# Patient Record
Sex: FEMALE | Race: WHITE | HISPANIC OR LATINO | Employment: UNEMPLOYED | URBAN - METROPOLITAN AREA
[De-identification: names, ages, dates, MRNs, and addresses within clinical notes are randomized per-mention and may not be internally consistent; named-entity substitution may affect disease eponyms.]

---

## 2024-05-29 ENCOUNTER — HOSPITAL ENCOUNTER (INPATIENT)
Facility: OTHER | Age: 44
LOS: 2 days | Discharge: HOME | DRG: 761 | End: 2024-05-31
Attending: EMERGENCY MEDICINE | Admitting: STUDENT IN AN ORGANIZED HEALTH CARE EDUCATION/TRAINING PROGRAM

## 2024-05-29 DIAGNOSIS — N93.8 DYSFUNCTIONAL UTERINE BLEEDING: ICD-10-CM

## 2024-05-29 DIAGNOSIS — I95.9 HYPOTENSION, UNSPECIFIED HYPOTENSION TYPE: ICD-10-CM

## 2024-05-29 DIAGNOSIS — D64.9 ANEMIA, UNSPECIFIED TYPE: ICD-10-CM

## 2024-05-29 DIAGNOSIS — N93.9 ABNORMAL UTERINE BLEEDING (AUB): Primary | ICD-10-CM

## 2024-05-29 LAB
ABO + RH BLD: NORMAL
ALBUMIN SERPL BCP-MCNC: 3.4 G/DL (ref 3.5–5.2)
ALP SERPL-CCNC: 115 U/L (ref 55–135)
ALT SERPL W/O P-5'-P-CCNC: 15 U/L (ref 10–44)
ANION GAP SERPL CALC-SCNC: 13 MMOL/L (ref 8–16)
AST SERPL-CCNC: 25 U/L (ref 10–40)
B-HCG UR QL: NEGATIVE
BACTERIA #/AREA URNS HPF: ABNORMAL /HPF
BACTERIA GENITAL QL WET PREP: ABNORMAL
BASOPHILS # BLD AUTO: 0.05 K/UL (ref 0–0.2)
BASOPHILS # BLD AUTO: 0.08 K/UL (ref 0–0.2)
BASOPHILS NFR BLD: 0.4 % (ref 0–1.9)
BASOPHILS NFR BLD: 0.5 % (ref 0–1.9)
BILIRUB SERPL-MCNC: 0.4 MG/DL (ref 0.1–1)
BILIRUB UR QL STRIP: NEGATIVE
BLD GP AB SCN CELLS X3 SERPL QL: NORMAL
BLD PROD TYP BPU: NORMAL
BLOOD UNIT EXPIRATION DATE: NORMAL
BLOOD UNIT TYPE CODE: 5100
BLOOD UNIT TYPE: NORMAL
BUN SERPL-MCNC: 8 MG/DL (ref 6–20)
CALCIUM SERPL-MCNC: 9.2 MG/DL (ref 8.7–10.5)
CHLORIDE SERPL-SCNC: 100 MMOL/L (ref 95–110)
CLARITY UR: ABNORMAL
CLUE CELLS VAG QL WET PREP: ABNORMAL
CO2 SERPL-SCNC: 21 MMOL/L (ref 23–29)
CODING SYSTEM: NORMAL
COLOR UR: ABNORMAL
CREAT SERPL-MCNC: 0.7 MG/DL (ref 0.5–1.4)
CROSSMATCH INTERPRETATION: NORMAL
CTP QC/QA: YES
DIFFERENTIAL METHOD BLD: ABNORMAL
DIFFERENTIAL METHOD BLD: ABNORMAL
DISPENSE STATUS: NORMAL
EOSINOPHIL # BLD AUTO: 0.3 K/UL (ref 0–0.5)
EOSINOPHIL # BLD AUTO: 0.3 K/UL (ref 0–0.5)
EOSINOPHIL NFR BLD: 1.5 % (ref 0–8)
EOSINOPHIL NFR BLD: 2 % (ref 0–8)
ERYTHROCYTE [DISTWIDTH] IN BLOOD BY AUTOMATED COUNT: 15.3 % (ref 11.5–14.5)
ERYTHROCYTE [DISTWIDTH] IN BLOOD BY AUTOMATED COUNT: 15.4 % (ref 11.5–14.5)
EST. GFR  (NO RACE VARIABLE): >60 ML/MIN/1.73 M^2
FILAMENT FUNGI VAG WET PREP-#/AREA: ABNORMAL
GLUCOSE SERPL-MCNC: 122 MG/DL (ref 70–110)
GLUCOSE UR QL STRIP: NEGATIVE
HCT VFR BLD AUTO: 24 % (ref 37–48.5)
HCT VFR BLD AUTO: 29.9 % (ref 37–48.5)
HCV AB SERPL QL IA: NEGATIVE
HGB BLD-MCNC: 7.8 G/DL (ref 12–16)
HGB BLD-MCNC: 9.7 G/DL (ref 12–16)
HGB UR QL STRIP: ABNORMAL
HIV 1+2 AB+HIV1 P24 AG SERPL QL IA: NEGATIVE
IMM GRANULOCYTES # BLD AUTO: 0.05 K/UL (ref 0–0.04)
IMM GRANULOCYTES # BLD AUTO: 0.08 K/UL (ref 0–0.04)
IMM GRANULOCYTES NFR BLD AUTO: 0.4 % (ref 0–0.5)
IMM GRANULOCYTES NFR BLD AUTO: 0.5 % (ref 0–0.5)
KETONES UR QL STRIP: NEGATIVE
LEUKOCYTE ESTERASE UR QL STRIP: ABNORMAL
LYMPHOCYTES # BLD AUTO: 1.9 K/UL (ref 1–4.8)
LYMPHOCYTES # BLD AUTO: 2.3 K/UL (ref 1–4.8)
LYMPHOCYTES NFR BLD: 10.9 % (ref 18–48)
LYMPHOCYTES NFR BLD: 16.5 % (ref 18–48)
MCH RBC QN AUTO: 23.3 PG (ref 27–31)
MCH RBC QN AUTO: 23.4 PG (ref 27–31)
MCHC RBC AUTO-ENTMCNC: 32.4 G/DL (ref 32–36)
MCHC RBC AUTO-ENTMCNC: 32.5 G/DL (ref 32–36)
MCV RBC AUTO: 72 FL (ref 82–98)
MCV RBC AUTO: 72 FL (ref 82–98)
MICROSCOPIC COMMENT: ABNORMAL
MONOCYTES # BLD AUTO: 0.9 K/UL (ref 0.3–1)
MONOCYTES # BLD AUTO: 1 K/UL (ref 0.3–1)
MONOCYTES NFR BLD: 5.6 % (ref 4–15)
MONOCYTES NFR BLD: 6.6 % (ref 4–15)
NEUTROPHILS # BLD AUTO: 10.5 K/UL (ref 1.8–7.7)
NEUTROPHILS # BLD AUTO: 13.9 K/UL (ref 1.8–7.7)
NEUTROPHILS NFR BLD: 74.1 % (ref 38–73)
NEUTROPHILS NFR BLD: 81 % (ref 38–73)
NITRITE UR QL STRIP: NEGATIVE
NRBC BLD-RTO: 0 /100 WBC
NRBC BLD-RTO: 0 /100 WBC
PH UR STRIP: 6 [PH] (ref 5–8)
PLATELET # BLD AUTO: 432 K/UL (ref 150–450)
PLATELET # BLD AUTO: 527 K/UL (ref 150–450)
PMV BLD AUTO: 8.8 FL (ref 9.2–12.9)
PMV BLD AUTO: 8.9 FL (ref 9.2–12.9)
POCT GLUCOSE: 167 MG/DL (ref 70–110)
POTASSIUM SERPL-SCNC: 4.3 MMOL/L (ref 3.5–5.1)
PROT SERPL-MCNC: 7.5 G/DL (ref 6–8.4)
PROT UR QL STRIP: ABNORMAL
RBC # BLD AUTO: 3.34 M/UL (ref 4–5.4)
RBC # BLD AUTO: 4.16 M/UL (ref 4–5.4)
RBC #/AREA URNS HPF: >100 /HPF (ref 0–4)
SODIUM SERPL-SCNC: 134 MMOL/L (ref 136–145)
SP GR UR STRIP: 1.01 (ref 1–1.03)
SPECIMEN OUTDATE: NORMAL
SPECIMEN SOURCE: ABNORMAL
SQUAMOUS #/AREA URNS HPF: 1 /HPF
T VAGINALIS GENITAL QL WET PREP: ABNORMAL
TRANS ERYTHROCYTES VOL PATIENT: NORMAL ML
URN SPEC COLLECT METH UR: ABNORMAL
UROBILINOGEN UR STRIP-ACNC: NEGATIVE EU/DL
WBC # BLD AUTO: 14.15 K/UL (ref 3.9–12.7)
WBC # BLD AUTO: 17.1 K/UL (ref 3.9–12.7)
WBC #/AREA URNS HPF: 9 /HPF (ref 0–5)
WBC #/AREA VAG WET PREP: ABNORMAL
YEAST GENITAL QL WET PREP: ABNORMAL

## 2024-05-29 PROCEDURE — 87389 HIV-1 AG W/HIV-1&-2 AB AG IA: CPT | Performed by: EMERGENCY MEDICINE

## 2024-05-29 PROCEDURE — 30233N1 TRANSFUSION OF NONAUTOLOGOUS RED BLOOD CELLS INTO PERIPHERAL VEIN, PERCUTANEOUS APPROACH: ICD-10-PCS | Performed by: OBSTETRICS & GYNECOLOGY

## 2024-05-29 PROCEDURE — 25000003 PHARM REV CODE 250

## 2024-05-29 PROCEDURE — 25000003 PHARM REV CODE 250: Performed by: EMERGENCY MEDICINE

## 2024-05-29 PROCEDURE — 85025 COMPLETE CBC W/AUTO DIFF WBC: CPT

## 2024-05-29 PROCEDURE — P9021 RED BLOOD CELLS UNIT: HCPCS | Performed by: EMERGENCY MEDICINE

## 2024-05-29 PROCEDURE — 36415 COLL VENOUS BLD VENIPUNCTURE: CPT

## 2024-05-29 PROCEDURE — 36430 TRANSFUSION BLD/BLD COMPNT: CPT

## 2024-05-29 PROCEDURE — 81025 URINE PREGNANCY TEST: CPT

## 2024-05-29 PROCEDURE — 12000002 HC ACUTE/MED SURGE SEMI-PRIVATE ROOM

## 2024-05-29 PROCEDURE — 96374 THER/PROPH/DIAG INJ IV PUSH: CPT

## 2024-05-29 PROCEDURE — 99285 EMERGENCY DEPT VISIT HI MDM: CPT | Mod: 25

## 2024-05-29 PROCEDURE — 63600175 PHARM REV CODE 636 W HCPCS: Mod: JZ,JG | Performed by: GENERAL PRACTICE

## 2024-05-29 PROCEDURE — 63600175 PHARM REV CODE 636 W HCPCS

## 2024-05-29 PROCEDURE — 85025 COMPLETE CBC W/AUTO DIFF WBC: CPT | Mod: 91

## 2024-05-29 PROCEDURE — 96375 TX/PRO/DX INJ NEW DRUG ADDON: CPT

## 2024-05-29 PROCEDURE — 63600175 PHARM REV CODE 636 W HCPCS: Performed by: EMERGENCY MEDICINE

## 2024-05-29 PROCEDURE — 86901 BLOOD TYPING SEROLOGIC RH(D): CPT

## 2024-05-29 PROCEDURE — 86920 COMPATIBILITY TEST SPIN: CPT | Performed by: EMERGENCY MEDICINE

## 2024-05-29 PROCEDURE — 80053 COMPREHEN METABOLIC PANEL: CPT

## 2024-05-29 PROCEDURE — 87210 SMEAR WET MOUNT SALINE/INK: CPT

## 2024-05-29 PROCEDURE — 96361 HYDRATE IV INFUSION ADD-ON: CPT

## 2024-05-29 PROCEDURE — 87591 N.GONORRHOEAE DNA AMP PROB: CPT

## 2024-05-29 PROCEDURE — 96376 TX/PRO/DX INJ SAME DRUG ADON: CPT

## 2024-05-29 PROCEDURE — 81000 URINALYSIS NONAUTO W/SCOPE: CPT

## 2024-05-29 PROCEDURE — 82962 GLUCOSE BLOOD TEST: CPT

## 2024-05-29 PROCEDURE — 86803 HEPATITIS C AB TEST: CPT | Performed by: EMERGENCY MEDICINE

## 2024-05-29 PROCEDURE — 25000003 PHARM REV CODE 250: Performed by: GENERAL PRACTICE

## 2024-05-29 RX ORDER — ONDANSETRON HYDROCHLORIDE 2 MG/ML
4 INJECTION, SOLUTION INTRAVENOUS
Status: COMPLETED | OUTPATIENT
Start: 2024-05-29 | End: 2024-05-29

## 2024-05-29 RX ORDER — OXYCODONE HYDROCHLORIDE 10 MG/1
10 TABLET ORAL EVERY 6 HOURS PRN
Status: DISCONTINUED | OUTPATIENT
Start: 2024-05-29 | End: 2024-05-31 | Stop reason: HOSPADM

## 2024-05-29 RX ORDER — HYDROCODONE BITARTRATE AND ACETAMINOPHEN 5; 325 MG/1; MG/1
1 TABLET ORAL
Status: COMPLETED | OUTPATIENT
Start: 2024-05-29 | End: 2024-05-29

## 2024-05-29 RX ORDER — OXYCODONE HYDROCHLORIDE 5 MG/1
5 TABLET ORAL EVERY 6 HOURS PRN
Status: DISCONTINUED | OUTPATIENT
Start: 2024-05-29 | End: 2024-05-31 | Stop reason: HOSPADM

## 2024-05-29 RX ORDER — HYDROCODONE BITARTRATE AND ACETAMINOPHEN 500; 5 MG/1; MG/1
TABLET ORAL
Status: DISCONTINUED | OUTPATIENT
Start: 2024-05-29 | End: 2024-05-31 | Stop reason: HOSPADM

## 2024-05-29 RX ORDER — SODIUM CHLORIDE 0.9 % (FLUSH) 0.9 %
10 SYRINGE (ML) INJECTION
Status: DISCONTINUED | OUTPATIENT
Start: 2024-05-29 | End: 2024-05-31 | Stop reason: HOSPADM

## 2024-05-29 RX ORDER — CEFTRIAXONE 500 MG/1
500 INJECTION, POWDER, FOR SOLUTION INTRAMUSCULAR; INTRAVENOUS
Status: DISCONTINUED | OUTPATIENT
Start: 2024-05-29 | End: 2024-05-29

## 2024-05-29 RX ORDER — IBUPROFEN 400 MG/1
800 TABLET ORAL EVERY 6 HOURS PRN
Status: DISCONTINUED | OUTPATIENT
Start: 2024-05-29 | End: 2024-05-30

## 2024-05-29 RX ORDER — ONDANSETRON HYDROCHLORIDE 2 MG/ML
INJECTION, SOLUTION INTRAVENOUS
Status: COMPLETED
Start: 2024-05-29 | End: 2024-05-29

## 2024-05-29 RX ORDER — ONDANSETRON 8 MG/1
8 TABLET, ORALLY DISINTEGRATING ORAL EVERY 8 HOURS PRN
Status: DISCONTINUED | OUTPATIENT
Start: 2024-05-29 | End: 2024-05-30

## 2024-05-29 RX ORDER — KETOROLAC TROMETHAMINE 30 MG/ML
10 INJECTION, SOLUTION INTRAMUSCULAR; INTRAVENOUS
Status: COMPLETED | OUTPATIENT
Start: 2024-05-29 | End: 2024-05-29

## 2024-05-29 RX ADMIN — SODIUM CHLORIDE 500 ML: 9 INJECTION, SOLUTION INTRAVENOUS at 08:05

## 2024-05-29 RX ADMIN — ONDANSETRON 4 MG: 2 INJECTION INTRAMUSCULAR; INTRAVENOUS at 07:05

## 2024-05-29 RX ADMIN — SODIUM CHLORIDE 1000 ML: 0.9 INJECTION, SOLUTION INTRAVENOUS at 04:05

## 2024-05-29 RX ADMIN — ONDANSETRON 4 MG: 2 INJECTION INTRAMUSCULAR; INTRAVENOUS at 03:05

## 2024-05-29 RX ADMIN — HYDROCODONE BITARTRATE AND ACETAMINOPHEN 1 TABLET: 5; 325 TABLET ORAL at 05:05

## 2024-05-29 RX ADMIN — ONDANSETRON HYDROCHLORIDE 4 MG: 2 INJECTION, SOLUTION INTRAVENOUS at 07:05

## 2024-05-29 RX ADMIN — SODIUM CHLORIDE, POTASSIUM CHLORIDE, SODIUM LACTATE AND CALCIUM CHLORIDE 1000 ML: 600; 310; 30; 20 INJECTION, SOLUTION INTRAVENOUS at 07:05

## 2024-05-29 RX ADMIN — CONJUGATED ESTROGENS 25 MG: 25 INJECTION, POWDER, LYOPHILIZED, FOR SOLUTION INTRAMUSCULAR; INTRAVENOUS at 09:05

## 2024-05-29 RX ADMIN — OXYCODONE HYDROCHLORIDE 5 MG: 5 TABLET ORAL at 10:05

## 2024-05-29 RX ADMIN — KETOROLAC TROMETHAMINE 10 MG: 30 INJECTION, SOLUTION INTRAMUSCULAR at 03:05

## 2024-05-29 NOTE — ED TRIAGE NOTES
DANO  used. Pt reports to ED with vaginal bleeding x 3 days, abdominal pain, nausea, and vomiting. States she started bleeding 15 days ago and then it stopped an now it came back. Reports bleeding through 1 pad/hour. Pt tearful on arrival and difficulty ambulating. Aaox4.

## 2024-05-29 NOTE — ED PROVIDER NOTES
Source of History:  Patient    Chief complaint:  Vaginal Bleeding (Vaginal bleeding with clots and 10/10 pelvic pain onset 15 days ago; saturating diaper/hour since last night. Endorses lightheadedness and dizziness. N/V and loss of appetite since midnight last night. Hx uterine fibroids. )      HPI:  Rama Rashid is a 43 y.o. female with PMH of htn and DM presenting with c/o vaginal bleeding with severe pelvic pain times 3 days.  Patient reports that she has a history of fibroids.  She states that she had a normal period at the beginning of this month.  States it 15 days ago, she had 3 days of heavy bleeding with associated pelvic pain that then spontaneously resolved.  She reports that 3 days ago, the heavy bleeding and pelvic pain returned.  Reports she was passing blood clots.  She is unable to describe the pain, but points to her lower abdomen when asked the location of pain.  She reports associated nausea and vomiting, reports vomiting has worsened since midnight last night.  She denies any dizziness or lightheadedness, but does report some generalized fatigue.  She denies any syncopal episodes.  She denies any associated fever, chills, chest pain, shortness of breath, upper respiratory symptoms, urinary symptoms, vaginal discharge, concern for STDs.  She states that she still has a menstrual cycles every month.  She reports that she had an endometrial biopsy performed last year in [facility].      Елена Olvera used to obtain history.     This is the extent to the patients complaints today here in the emergency department.    PMH:  As per HPI and below:  History reviewed. No pertinent past medical history.  History reviewed. No pertinent surgical history.    Social History     Tobacco Use    Smoking status: Unknown     Review of patient's allergies indicates:  No Known Allergies    ROS: As per HPI     Physical Exam:    BP 93/60   Pulse 96   Temp 98.1 °F (36.7 °C) (Oral)   Resp (!) 21    "Ht 4' 7.12" (1.4 m)   Wt 83.9 kg (185 lb)   SpO2 98%   Breastfeeding No   BMI 42.81 kg/m²   Vitals:    05/29/24 1932 05/29/24 2002 05/29/24 2010 05/29/24 2015   BP:  105/70  96/63   Pulse:  79 84 94   Resp:  20 14    Temp:       TempSrc:       SpO2:  100% 100% 99%   Weight: 83.9 kg (185 lb)      Height: 4' 7.12" (1.4 m)       05/29/24 2046   BP: 93/60   Pulse: 96   Resp: (!) 21   Temp:    TempSrc:    SpO2: 98%   Weight:    Height:        Nurse's notes vitals reviewed  Constitutional: Patient alert and oriented well-developed well-nourished.  Appears in discomfort on exam, one episode of clear emesis on my exam.  Eyes:  Normal conjunctiva.  Extraocular muscles are intact.  ENT: Oral mucosa moist  Chest:  Lungs clear to auscultation bilaterally  Cardiovascular:  Heart tachycardic rate, regular rhythm.  No murmurs.  GI:  Upper abdomen is soft with no tenderness to palpation, no rebound, no guarding.  Diffuse tenderness at the lower abdomen with associated guarding.  No masses.  :  Pelvic exam performed with female RN chaperone in the room.  On speculum exam, patient with moderate amount of bleeding present, dark red blood.  Cervix partially visualized, pink with no erythema or lesions.  No vaginal discharge noted.  Vaginal mucosa pink and moist.  On bimanual exam, patient with no cervical motion tenderness, however does have diffuse uterine and bilateral adnexal tenderness.  Musculoskeletal: Normocephalic atraumatic, normal range of motion, no obvious deformities  Skin: Warm and dry no rashes or lesions, no ecchymosis, no erythema  Neuro: alert and oriented x3,  no focal neurological deficits.  Psych: Appropriate, conversant      Labs Reviewed   VAGINAL SCREEN - Abnormal; Notable for the following components:       Result Value    Clue Cells Rare (*)     WBC - Vaginal Screen Rare (*)     Bacteria - Vaginal Screen Few (*)     All other components within normal limits    Narrative:     Release to patient->Immediate "   CBC W/ AUTO DIFFERENTIAL - Abnormal; Notable for the following components:    WBC 17.10 (*)     Hemoglobin 9.7 (*)     Hematocrit 29.9 (*)     MCV 72 (*)     MCH 23.3 (*)     RDW 15.3 (*)     Platelets 527 (*)     MPV 8.9 (*)     Gran # (ANC) 13.9 (*)     Immature Grans (Abs) 0.08 (*)     Gran % 81.0 (*)     Lymph % 10.9 (*)     All other components within normal limits   COMPREHENSIVE METABOLIC PANEL - Abnormal; Notable for the following components:    Sodium 134 (*)     CO2 21 (*)     Glucose 122 (*)     Albumin 3.4 (*)     All other components within normal limits   URINALYSIS, REFLEX TO URINE CULTURE - Abnormal; Notable for the following components:    Color, UA Red (*)     Appearance, UA Hazy (*)     Protein, UA Trace (*)     Occult Blood UA 3+ (*)     Leukocytes, UA 1+ (*)     All other components within normal limits    Narrative:     Specimen Source->Urine   URINALYSIS MICROSCOPIC - Abnormal; Notable for the following components:    RBC, UA >100 (*)     WBC, UA 9 (*)     All other components within normal limits    Narrative:     Specimen Source->Urine   CBC W/ AUTO DIFFERENTIAL - Abnormal; Notable for the following components:    WBC 14.15 (*)     RBC 3.34 (*)     Hemoglobin 7.8 (*)     Hematocrit 24.0 (*)     MCV 72 (*)     MCH 23.4 (*)     RDW 15.4 (*)     MPV 8.8 (*)     Gran # (ANC) 10.5 (*)     Immature Grans (Abs) 0.05 (*)     Gran % 74.1 (*)     Lymph % 16.5 (*)     All other components within normal limits   POCT GLUCOSE - Abnormal; Notable for the following components:    POCT Glucose 167 (*)     All other components within normal limits   HIV 1 / 2 ANTIBODY    Narrative:     Release to patient->Immediate   HEPATITIS C ANTIBODY    Narrative:     Release to patient->Immediate   C. TRACHOMATIS/N. GONORRHOEAE BY AMP DNA   LACTIC ACID, PLASMA   POCT URINE PREGNANCY   TYPE & SCREEN   PREPARE RBC SOFT       US Pelvis Comp with Transvag NON-OB (xpd)   Final Result      Enlarged, heterogeneous uterus  suspicious for adenomyosis.  This can be evaluated with female pelvis MRI without and with contrast as an outpatient as warranted clinically.      Neither ovary identified.  No suspicious adnexal mass on provided images.         Electronically signed by: Patricia Moran   Date:    05/29/2024   Time:    16:44            Initial Impression/ Differential Dx:  Differential Diagnosis includes, but is not limited to:  Pregnancy complication (threatened AB, inevitable AB, incomplete Ab, missed AB, uterine rupture, ectopic pregnancy, placental abruption, placenta previa), ovarian cyst/torsion, STD, foreign body, trauma, normal menstruation.    MDM:    Medical Decision Making  Urgent evaluation of 43-year-old female with past medical history of diabetes presenting for evaluation of vaginal bleeding with associated pelvic pain x3 days with worsening over the past 1 day.  Patient was from [facility], unable to view patient's past medical records, but she does report history of diabetes and history of fibroids.  Afebrile, tachycardic at 129 beats per minute on arrival, otherwise hemodynamically stable.  On my exam, patient appears uncomfortable and has diffuse lower abdominal tenderness to palpation.  We will plan for labs, UA, ultrasound of the pelvis for further evaluation.    Problems Addressed:  Anemia, unspecified type: acute illness or injury  Dysfunctional uterine bleeding: acute illness or injury  Hypotension, unspecified hypotension type: acute illness or injury    Amount and/or Complexity of Data Reviewed  Labs: ordered. Decision-making details documented in ED Course.  Radiology: ordered.    Risk  OTC drugs.  Prescription drug management.  Parenteral controlled substances.  Drug therapy requiring intensive monitoring for toxicity.  Decision regarding hospitalization.        ED Course as of 05/29/24 2103   Wed May 29, 2024   1520 hCG Qualitative, Urine: Negative [SW]   1539 POCT Glucose(!): 167 [SW]   1808 CBC auto  "differential(!)  Notable for leukocytosis with white blood cell count of 17, anemia with H&H of 9.7/29.9.  Mild thrombocytosis at 527, suspect reactive. [SW]   1808 Comprehensive metabolic panel(!)  Mild hyponatremia, fluids given.  Otherwise no significant electrolyte abnormality, normal renal function, no elevation in liver enzymes. [SW]   1808 Urinalysis, Reflex to Urine Culture Urine, Clean Catch(!)  Notable for trace protein, 3+ blood, 1+ leukocytes.  Only 9 white blood cells on microscopy and occasional bacteria.  Likely contaminated secondary to vaginal bleeding.  Do not suspect UTI at this time. [SW]   1810 US results of  "Enlarged, heterogeneous uterus suspicious for adenomyosis.  This can be evaluated with female pelvis MRI without and with contrast as an outpatient as warranted clinically.     Neither ovary identified.  No suspicious adnexal mass on provided images." [SW]   1811 Discussed patient with OB gyn provider Dr. Baxter. Pain may be secondary to adenomyosis, however given elevated WBC count and significant tenderness on exam, Dr. Baxter will evaluate patient at bedside. Currently at bedside with patient.  [SW]   1902 Ob gyn provider Dr. Kovacs evaluated patient at bedside.  They would like repeat CBC to ensure H&H is remaining stable.  Repeat CBC pending at this time.  They will make final recommendations after CBC results, considering treatment for possible PID given tenderness on exam.  We will defer antibiotic treatment to Ob gyn providers.  At this time, I am checking patient out to my attending provider, Dr. Cadet, pending repeat CBC and final OB/Gyn recs.  [SW]      ED Course User Index  [SW] Merlene Lion PA-C       Diagnostic Impression:    1. Dysfunctional uterine bleeding    2. Anemia, unspecified type    3. Hypotension, unspecified hypotension type         ED Disposition Condition    Admit Stable                Critical Care    Date/Time: 5/29/2024 8:00 PM    Performed by: " Jefferson Cadet MD  Authorized by: Jefferson Cadet MD  Direct patient critical care time: 20 minutes  Additional history critical care time: 10 minutes  Ordering / reviewing critical care time: 6 minutes  Documentation critical care time: 6 minutes  Consulting other physicians critical care time: 5 minutes  Total critical care time (exclusive of procedural time) : 47 minutes  Critical care time was exclusive of teaching time and separately billable procedures and treating other patients.  Critical care was necessary to treat or prevent imminent or life-threatening deterioration of the following conditions: hemorrhage.  Critical care was time spent personally by me on the following activities: blood draw for specimens, development of treatment plan with patient or surrogate, discussions with consultants, interpretation of cardiac output measurements, evaluation of patient's response to treatment, examination of patient, obtaining history from patient or surrogate, ordering and performing treatments and interventions, ordering and review of laboratory studies, ordering and review of radiographic studies, pulse oximetry, re-evaluation of patient's condition and review of old charts.       I have reviewed the notes, assessments, and/or procedures performed by Merlene Lion, I concur with her/his documentation of Rama Rashid.     [id number]-old female with persistent significant vaginal bleeding   A/p- this woman is actively bleeding, had an episode of moderate hypotension, was fluid responsive.    Repeat H&H is down trending, will plan for transfusion, administration of estrogen intravenously.    Have discussed the case with on-call gynecology resident Dr. Kovacs- will plan for admission at this time for ongoing monitoring and reassessment.          Merlene Lion PA-C  05/29/24 [id number]       Jefferson Cadet MD  05/29/24 0471

## 2024-05-30 LAB
BASOPHILS # BLD AUTO: 0.03 K/UL (ref 0–0.2)
BASOPHILS # BLD AUTO: 0.05 K/UL (ref 0–0.2)
BASOPHILS NFR BLD: 0.3 % (ref 0–1.9)
BASOPHILS NFR BLD: 0.3 % (ref 0–1.9)
BLD PROD TYP BPU: NORMAL
BLOOD UNIT EXPIRATION DATE: NORMAL
BLOOD UNIT TYPE CODE: 5100
BLOOD UNIT TYPE: NORMAL
C TRACH DNA SPEC QL NAA+PROBE: NOT DETECTED
CODING SYSTEM: NORMAL
CROSSMATCH INTERPRETATION: NORMAL
DIFFERENTIAL METHOD BLD: ABNORMAL
DIFFERENTIAL METHOD BLD: ABNORMAL
DISPENSE STATUS: NORMAL
EOSINOPHIL # BLD AUTO: 0 K/UL (ref 0–0.5)
EOSINOPHIL # BLD AUTO: 0.2 K/UL (ref 0–0.5)
EOSINOPHIL NFR BLD: 0.3 % (ref 0–8)
EOSINOPHIL NFR BLD: 1.6 % (ref 0–8)
ERYTHROCYTE [DISTWIDTH] IN BLOOD BY AUTOMATED COUNT: 15.2 % (ref 11.5–14.5)
ERYTHROCYTE [DISTWIDTH] IN BLOOD BY AUTOMATED COUNT: 15.5 % (ref 11.5–14.5)
HCT VFR BLD AUTO: 23.2 % (ref 37–48.5)
HCT VFR BLD AUTO: 32.4 % (ref 37–48.5)
HGB BLD-MCNC: 10.5 G/DL (ref 12–16)
HGB BLD-MCNC: 7.5 G/DL (ref 12–16)
IMM GRANULOCYTES # BLD AUTO: 0.03 K/UL (ref 0–0.04)
IMM GRANULOCYTES # BLD AUTO: 0.09 K/UL (ref 0–0.04)
IMM GRANULOCYTES NFR BLD AUTO: 0.3 % (ref 0–0.5)
IMM GRANULOCYTES NFR BLD AUTO: 0.6 % (ref 0–0.5)
LYMPHOCYTES # BLD AUTO: 1 K/UL (ref 1–4.8)
LYMPHOCYTES # BLD AUTO: 2 K/UL (ref 1–4.8)
LYMPHOCYTES NFR BLD: 18.1 % (ref 18–48)
LYMPHOCYTES NFR BLD: 6.5 % (ref 18–48)
MCH RBC QN AUTO: 23.5 PG (ref 27–31)
MCH RBC QN AUTO: 23.9 PG (ref 27–31)
MCHC RBC AUTO-ENTMCNC: 32.3 G/DL (ref 32–36)
MCHC RBC AUTO-ENTMCNC: 32.4 G/DL (ref 32–36)
MCV RBC AUTO: 73 FL (ref 82–98)
MCV RBC AUTO: 74 FL (ref 82–98)
MONOCYTES # BLD AUTO: 0.5 K/UL (ref 0.3–1)
MONOCYTES # BLD AUTO: 0.6 K/UL (ref 0.3–1)
MONOCYTES NFR BLD: 3.6 % (ref 4–15)
MONOCYTES NFR BLD: 4.7 % (ref 4–15)
N GONORRHOEA DNA SPEC QL NAA+PROBE: NOT DETECTED
NEUTROPHILS # BLD AUTO: 13.7 K/UL (ref 1.8–7.7)
NEUTROPHILS # BLD AUTO: 8.2 K/UL (ref 1.8–7.7)
NEUTROPHILS NFR BLD: 75 % (ref 38–73)
NEUTROPHILS NFR BLD: 88.7 % (ref 38–73)
NRBC BLD-RTO: 0 /100 WBC
NRBC BLD-RTO: 0 /100 WBC
NUM UNITS TRANS PACKED RBC: NORMAL
PLATELET # BLD AUTO: 345 K/UL (ref 150–450)
PLATELET # BLD AUTO: 405 K/UL (ref 150–450)
PMV BLD AUTO: 8.6 FL (ref 9.2–12.9)
PMV BLD AUTO: 8.7 FL (ref 9.2–12.9)
POCT GLUCOSE: 141 MG/DL (ref 70–110)
POCT GLUCOSE: 160 MG/DL (ref 70–110)
POCT GLUCOSE: 172 MG/DL (ref 70–110)
POCT GLUCOSE: 184 MG/DL (ref 70–110)
POCT GLUCOSE: 197 MG/DL (ref 70–110)
RBC # BLD AUTO: 3.19 M/UL (ref 4–5.4)
RBC # BLD AUTO: 4.4 M/UL (ref 4–5.4)
WBC # BLD AUTO: 10.92 K/UL (ref 3.9–12.7)
WBC # BLD AUTO: 15.47 K/UL (ref 3.9–12.7)

## 2024-05-30 PROCEDURE — P9016 RBC LEUKOCYTES REDUCED: HCPCS | Performed by: GENERAL PRACTICE

## 2024-05-30 PROCEDURE — 25000003 PHARM REV CODE 250

## 2024-05-30 PROCEDURE — 11000001 HC ACUTE MED/SURG PRIVATE ROOM

## 2024-05-30 PROCEDURE — 85025 COMPLETE CBC W/AUTO DIFF WBC: CPT | Performed by: STUDENT IN AN ORGANIZED HEALTH CARE EDUCATION/TRAINING PROGRAM

## 2024-05-30 PROCEDURE — 63600175 PHARM REV CODE 636 W HCPCS

## 2024-05-30 PROCEDURE — 25000003 PHARM REV CODE 250: Performed by: GENERAL PRACTICE

## 2024-05-30 PROCEDURE — 86920 COMPATIBILITY TEST SPIN: CPT | Performed by: GENERAL PRACTICE

## 2024-05-30 PROCEDURE — 63600175 PHARM REV CODE 636 W HCPCS: Mod: JZ,JG | Performed by: GENERAL PRACTICE

## 2024-05-30 PROCEDURE — 36415 COLL VENOUS BLD VENIPUNCTURE: CPT

## 2024-05-30 PROCEDURE — 85025 COMPLETE CBC W/AUTO DIFF WBC: CPT | Mod: 91

## 2024-05-30 PROCEDURE — 99233 SBSQ HOSP IP/OBS HIGH 50: CPT | Mod: ,,, | Performed by: OBSTETRICS & GYNECOLOGY

## 2024-05-30 RX ORDER — HYDROMORPHONE HYDROCHLORIDE 1 MG/ML
1 INJECTION, SOLUTION INTRAMUSCULAR; INTRAVENOUS; SUBCUTANEOUS ONCE
Status: DISCONTINUED | OUTPATIENT
Start: 2024-05-30 | End: 2024-05-30

## 2024-05-30 RX ORDER — IBUPROFEN 200 MG
24 TABLET ORAL
Status: DISCONTINUED | OUTPATIENT
Start: 2024-05-30 | End: 2024-05-31 | Stop reason: HOSPADM

## 2024-05-30 RX ORDER — PROCHLORPERAZINE EDISYLATE 5 MG/ML
5 INJECTION INTRAMUSCULAR; INTRAVENOUS EVERY 6 HOURS PRN
Status: DISCONTINUED | OUTPATIENT
Start: 2024-05-30 | End: 2024-05-31 | Stop reason: HOSPADM

## 2024-05-30 RX ORDER — PROCHLORPERAZINE EDISYLATE 5 MG/ML
5 INJECTION INTRAMUSCULAR; INTRAVENOUS EVERY 6 HOURS PRN
Status: DISCONTINUED | OUTPATIENT
Start: 2024-05-30 | End: 2024-05-30

## 2024-05-30 RX ORDER — IBUPROFEN 200 MG
16 TABLET ORAL
Status: DISCONTINUED | OUTPATIENT
Start: 2024-05-30 | End: 2024-05-31 | Stop reason: HOSPADM

## 2024-05-30 RX ORDER — METFORMIN HYDROCHLORIDE 500 MG/1
500 TABLET ORAL
COMMUNITY
Start: 2024-05-21

## 2024-05-30 RX ORDER — DIPHENHYDRAMINE HCL 25 MG
25 CAPSULE ORAL EVERY 6 HOURS PRN
Status: DISCONTINUED | OUTPATIENT
Start: 2024-05-30 | End: 2024-05-31 | Stop reason: HOSPADM

## 2024-05-30 RX ORDER — HYDROCODONE BITARTRATE AND ACETAMINOPHEN 500; 5 MG/1; MG/1
TABLET ORAL
Status: DISCONTINUED | OUTPATIENT
Start: 2024-05-30 | End: 2024-05-31 | Stop reason: HOSPADM

## 2024-05-30 RX ORDER — HYDROMORPHONE HYDROCHLORIDE 1 MG/ML
0.5 INJECTION, SOLUTION INTRAMUSCULAR; INTRAVENOUS; SUBCUTANEOUS ONCE
Status: COMPLETED | OUTPATIENT
Start: 2024-05-30 | End: 2024-05-30

## 2024-05-30 RX ORDER — INSULIN ASPART 100 [IU]/ML
0-5 INJECTION, SOLUTION INTRAVENOUS; SUBCUTANEOUS
Status: DISCONTINUED | OUTPATIENT
Start: 2024-05-30 | End: 2024-05-31 | Stop reason: HOSPADM

## 2024-05-30 RX ORDER — GLUCAGON 1 MG
1 KIT INJECTION
Status: DISCONTINUED | OUTPATIENT
Start: 2024-05-30 | End: 2024-05-31 | Stop reason: HOSPADM

## 2024-05-30 RX ORDER — KETOROLAC TROMETHAMINE 30 MG/ML
30 INJECTION, SOLUTION INTRAMUSCULAR; INTRAVENOUS EVERY 6 HOURS
Status: DISCONTINUED | OUTPATIENT
Start: 2024-05-30 | End: 2024-05-31

## 2024-05-30 RX ORDER — IBUPROFEN 600 MG/1
600 TABLET ORAL EVERY 6 HOURS
Status: DISCONTINUED | OUTPATIENT
Start: 2024-05-30 | End: 2024-05-30

## 2024-05-30 RX ORDER — ONDANSETRON HYDROCHLORIDE 2 MG/ML
8 INJECTION, SOLUTION INTRAVENOUS ONCE AS NEEDED
Status: COMPLETED | OUTPATIENT
Start: 2024-05-30 | End: 2024-05-30

## 2024-05-30 RX ORDER — ONDANSETRON HYDROCHLORIDE 2 MG/ML
8 INJECTION, SOLUTION INTRAVENOUS EVERY 6 HOURS PRN
Status: DISCONTINUED | OUTPATIENT
Start: 2024-05-30 | End: 2024-05-31 | Stop reason: HOSPADM

## 2024-05-30 RX ORDER — HYDROMORPHONE HYDROCHLORIDE 1 MG/ML
1 INJECTION, SOLUTION INTRAMUSCULAR; INTRAVENOUS; SUBCUTANEOUS
Status: DISCONTINUED | OUTPATIENT
Start: 2024-05-30 | End: 2024-05-30

## 2024-05-30 RX ADMIN — SODIUM CHLORIDE, POTASSIUM CHLORIDE, SODIUM LACTATE AND CALCIUM CHLORIDE 1000 ML: 600; 310; 30; 20 INJECTION, SOLUTION INTRAVENOUS at 01:05

## 2024-05-30 RX ADMIN — DIPHENHYDRAMINE HYDROCHLORIDE 25 MG: 25 CAPSULE ORAL at 02:05

## 2024-05-30 RX ADMIN — ONDANSETRON 8 MG: 2 INJECTION INTRAMUSCULAR; INTRAVENOUS at 08:05

## 2024-05-30 RX ADMIN — HYDROMORPHONE HYDROCHLORIDE 1 MG: 1 INJECTION, SOLUTION INTRAMUSCULAR; INTRAVENOUS; SUBCUTANEOUS at 12:05

## 2024-05-30 RX ADMIN — PROCHLORPERAZINE EDISYLATE 5 MG: 5 INJECTION INTRAMUSCULAR; INTRAVENOUS at 05:05

## 2024-05-30 RX ADMIN — OXYCODONE HYDROCHLORIDE 10 MG: 10 TABLET ORAL at 07:05

## 2024-05-30 RX ADMIN — CONJUGATED ESTROGENS 25 MG: 25 INJECTION, POWDER, LYOPHILIZED, FOR SOLUTION INTRAMUSCULAR; INTRAVENOUS at 05:05

## 2024-05-30 RX ADMIN — ONDANSETRON 8 MG: 8 TABLET, ORALLY DISINTEGRATING ORAL at 03:05

## 2024-05-30 RX ADMIN — HYDROMORPHONE HYDROCHLORIDE 0.5 MG: 0.5 INJECTION, SOLUTION INTRAMUSCULAR; INTRAVENOUS; SUBCUTANEOUS at 05:05

## 2024-05-30 RX ADMIN — HYDROMORPHONE HYDROCHLORIDE 1 MG: 1 INJECTION, SOLUTION INTRAMUSCULAR; INTRAVENOUS; SUBCUTANEOUS at 03:05

## 2024-05-30 RX ADMIN — HYDROMORPHONE HYDROCHLORIDE 1 MG: 1 INJECTION, SOLUTION INTRAMUSCULAR; INTRAVENOUS; SUBCUTANEOUS at 05:05

## 2024-05-30 RX ADMIN — IBUPROFEN 600 MG: 600 TABLET, FILM COATED ORAL at 12:05

## 2024-05-30 RX ADMIN — CONJUGATED ESTROGENS 25 MG: 25 INJECTION, POWDER, LYOPHILIZED, FOR SOLUTION INTRAMUSCULAR; INTRAVENOUS at 12:05

## 2024-05-30 RX ADMIN — HYDROMORPHONE HYDROCHLORIDE 1 MG: 1 INJECTION, SOLUTION INTRAMUSCULAR; INTRAVENOUS; SUBCUTANEOUS at 08:05

## 2024-05-30 RX ADMIN — KETOROLAC TROMETHAMINE 30 MG: 30 INJECTION, SOLUTION INTRAMUSCULAR; INTRAVENOUS at 06:05

## 2024-05-30 NOTE — ED NOTES
MD Pollack notified that pt c/o itching to face and swelling to R hand, airway patent, respirations unlabored, no tongue swelling noted.

## 2024-05-30 NOTE — PROGRESS NOTES
Progress Note  Gynecology    Admit Date: 2024  LOS: 2    Reason for Admission:  Abnormal uterine bleeding (AUB)    SUBJECTIVE:     Rama Rashid is a 43 y.o.  admitted for IV estrogen in the setting of AUB.     Overnight, patient only required 1 pad change. Denies significant symptoms of anemia but reports minimal ambulation. Reports continued nausea and vomiting overnight minimally responsive to ODT zofran. Reports small improvement in pelvic cramping. Voiding spontaneously. Passing flatus.     OBJECTIVE:     Vital Signs   Temp:  [97.6 °F (36.4 °C)-99 °F (37.2 °C)] 98.6 °F (37 °C)  Pulse:  [] 98  Resp:  [14-22] 18  SpO2:  [95 %-100 %] 96 %  BP: ()/(52-76) 106/76      Intake/Output Summary (Last 24 hours) at 2024 0556  Last data filed at 2024 2258  Gross per 24 hour   Intake 288 ml   Output --   Net 288 ml       Physical Exam:  Gen: A&Ox3, NAD  CV: Regular rate  Pulm: Normal respiratory effort  Abd: soft, obese, non-distended, mildly tender to palpation without rebound or guarding in the right lower quadrant   Ext: No peripheral edema, SCDs in place   : Deferred     Laboratory:  Lab Results   Component Value Date    WBC 10.92 2024    HGB 7.5 (L) 2024    HCT 23.2 (L) 2024    MCV 73 (L) 2024     2024         BMP  Lab Results   Component Value Date     (L) 2024    K 4.3 2024     2024    CO2 21 (L) 2024    BUN 8 2024    CREATININE 0.7 2024    CALCIUM 9.2 2024    ANIONGAP 13 2024    EGFRNORACEVR >60 2024     Lab Results   Component Value Date    ALT 15 2024    AST 25 2024    ALKPHOS 115 2024    BILITOT 0.4 2024         ASSESSMENT/PLAN:     Active Hospital Problems    Diagnosis  POA    *Abnormal uterine bleeding (AUB) [N93.9]  Unknown      Resolved Hospital Problems   No resolved problems to display.       Assessment: 43 y.o.  admitted for IV estrogen in  the setting of AUB. Hospital course complicated by abdominal pain and nausea/vomiting.     Plan:   Abnormal Uterine Bleeding  - Soft blood pressures overnight, no tachycardia   - Saturated 1 pad overnight   - H/H trend: 7.8/24> 1u pRBC>7.5/23.2  - Inappropriate response to 1u pRBC, likely from delayed equilibration. Will transfuse additional unit this AM. Post- transfusion CBC ordered.  - Continue strict pad counts.   - Patient received no NSAIDS for pain control overnight Received 1 oxy 5 overnight at 10pm. Pain not well controlled this AM, but suspect due to under treatment. Will scheduled 600mg ibuprofen q6, and use oxy 5/10 PRN.   - IV zofran/compazine PRN for symptomatic nausea   - Continue IV estrogen, can transition to PO provera once 24 hours on IV estrogen reached        T2DM  - Home metformin held inpatient   - POCT glucoses 3 times before meals and QHS.  on CMP on arrival. No POCT glucoses taken overnight.  - Diabetic diet   - SSI in place     Hypertension   - Holding home losartan given soft pressures     Dispo: As patient meets appropriate post-op milestones, plan for discharge as appropriate.     Amada Baxter MD  Obstetrics and Gynecology

## 2024-05-30 NOTE — CARE UPDATE
Afternoon Assessment:  Patient reports bleeding is very minimal. Endorses frequent episodes of vomiting. Unable to tolerate dinner. Denies lightheadedness, dizziness, CP, palpitations or SOB. Reports pain relieved with scheduled ibuprofen and IV Dilaudid PRN     Temp:  [97.6 °F (36.4 °C)-99 °F (37.2 °C)] 97.7 °F (36.5 °C)  Pulse:  [67-98] 70  Resp:  [14-22] 18  SpO2:  [94 %-100 %] 100 %  BP: ()/(52-78) 105/64    PE:  Abdomen: soft, non-tender, non distended     Labs:  Recent Labs   Lab 24  1134   WBC 15.47*   RBC 4.40   HGB 10.5*   HCT 32.4*      MCV 74*   MCH 23.9*   MCHC 32.4       A/P:  43 y.o.  admitted for acute AUB. Bleeding and pain stable. Patient now endorsing persistent nausea and vomiting.   - Dilaudid discontinued and IV Toradol added.  - Will transition from PO zofran to IV zofran given persistent nausea.   - Bleeding improved. Plan to transition to PO provera tomorrow.   - CT negative. Abdominal exam benign. No concern for acute abdominal process.   - Continue to monitor.     Compa Kovacs MD  OBGYN PGY-2

## 2024-05-30 NOTE — CONSULTS
[facility] Emergency Dept  Obstetrics & Gynecology  Consult Note    Patient Name: Rama Rashid  MRN: [id number]  Admission Date: 2024  Hospital Length of Stay: 0 days  Code Status: No Order  Primary Care Provider: No, Primary Doctor  Principal Problem: <principal problem not specified>    Inpatient consult to Obstetrics  Consult performed by: Compa Kovacs MD  Consult ordered by: Merlene Lion PA-C      In short, Rama Rashid is a 43 y.o.  admitted for acute AUB. Bleeding stable on exam. Patient had a drop in H/H from 9.7/29.9 to 7.8/24 while in the ED. Reports SOB and dizziness with ambulation.  Plan for admission for IV estrogen 25 mg q6h and transfusion of 1upRBC. See full H&P to follow.     Compa Kovacs MD  OBGYN PGY-2

## 2024-05-30 NOTE — ED NOTES
Patient was complaining about the temp in the room, thermostat read 71 took patient temp 97.6 adjusted thermostat

## 2024-05-30 NOTE — H&P
[facility] Emergency Dept  Obstetrics & Gynecology  History & Physical    Patient Name: Rama Rashid  MRN: [id number]  Admission Date: 2024  Primary Care Provider: Roxana, Primary Doctor    Subjective:     Chief Complaint/Reason for Admission: acute vaginal bleeding     History of Present Illness:   Rama Rashid is a 43 y.o.  who presents complaining of vaginal bleeding and pelvic pain. Patient reports about 2 weeks ago she started having heavier vaginal bleeding than her normal period.  At first, she was only saturating 3-4 pads per day but was passing large clots around the size of an orange. About 3 days ago, the bleeding became heavier and she began to have to wear diapers b/c of the bleeding. She reports changing the diapers about once per hour. She also reports nausea and vomiting that started yesterday. She has been unable to keep any food down. Patient reports SOB and dizziness with ambulation as well. Patient denies fever or chills. She endorses pelvic pain that has not been relieved by tylenol or ibuprofen. She states the pain feels like period cramps.     No current facility-administered medications on file prior to encounter.     No current outpatient medications on file prior to encounter.       Review of patient's allergies indicates:  No Known Allergies    History reviewed. No pertinent past medical history.  OB History   No obstetric history on file.     History reviewed. No pertinent surgical history.  Family History    None       Tobacco Use    Smoking status: Unknown    Smokeless tobacco: Not on file   Substance and Sexual Activity    Alcohol use: Not on file    Drug use: Not on file    Sexual activity: Not on file     Review of Systems   Constitutional:  Negative for chills and fever.   HENT:  Negative for nasal congestion.    Eyes:  Negative for visual disturbance.   Respiratory:  Negative for cough and shortness of breath.    Cardiovascular:  Negative for chest pain.    Gastrointestinal:  Negative for abdominal pain, constipation and diarrhea.   Genitourinary:  Positive for pelvic pain and vaginal bleeding. Negative for dysuria, frequency, urgency and vaginal discharge.   Musculoskeletal:  Negative for myalgias.   Integumentary:  Negative for rash.   Neurological:  Negative for headaches.   Hematological:  Does not bruise/bleed easily.   Psychiatric/Behavioral:  The patient is not nervous/anxious.      Objective:     Vital Signs (Most Recent):  Temp: 98.4 °F (36.9 °C) (05/29/24 2134)  Pulse: 86 (05/29/24 2134)  Resp: 18 (05/29/24 2134)  BP: 91/62 (05/29/24 2134)  SpO2: 99 % (05/29/24 2134) Vital Signs (24h Range):  Temp:  [97.6 °F (36.4 °C)-98.4 °F (36.9 °C)] 98.4 °F (36.9 °C)  Pulse:  [] 86  Resp:  [14-22] 18  SpO2:  [98 %-100 %] 99 %  BP: ()/(52-71) 91/62     Weight: 83.9 kg (185 lb)  Body mass index is 42.81 kg/m².  No LMP recorded.    Physical Exam:   Constitutional: She is oriented to person, place, and time. She appears well-developed and well-nourished.    HENT:   Head: Normocephalic and atraumatic.    Eyes: EOM are normal.     Cardiovascular:  Normal rate.             Pulmonary/Chest: Effort normal. No respiratory distress.        Abdominal: There is abdominal tenderness (diffuse). There is no guarding.     Genitourinary: Right adnexum displays no tenderness. Left adnexum displays no tenderness. There is bleeding in the vagina. No vaginal discharge in the vagina. Cervix exhibits no motion tenderness. Uterus is tender (minimal).               Neurological: She is alert and oriented to person, place, and time.    Skin: Skin is warm and dry.    Psychiatric: She has a normal mood and affect. Her behavior is normal. Thought content normal.     SSE: Moderate amount of bleeding and small clots in posterior vaginal vault. Blood able to be cleared with 5-6 procto swab. Slow oozing of blood from external cervical os.     Laboratory:  CBC:   Recent Labs   Lab  24  1826   WBC 14.15*   RBC 3.34*   HGB 7.8*   HCT 24.0*      MCV 72*   MCH 23.4*   MCHC 32.5     CMP:   Recent Labs   Lab 24  1539   *   CALCIUM 9.2   ALBUMIN 3.4*   PROT 7.5   *   K 4.3   CO2 21*      BUN 8   CREATININE 0.7   ALKPHOS 115   ALT 15   AST 25   BILITOT 0.4       Diagnostic Results:  US: Reviewed    Imaging Results              US Pelvis Comp with Transvag NON-OB (xpd) (Final result)  Result time 24 16:44:39      Final result by Patricia Moran MD (24 16:44:39)                   Impression:      Enlarged, heterogeneous uterus suspicious for adenomyosis.  This can be evaluated with female pelvis MRI without and with contrast as an outpatient as warranted clinically.    Neither ovary identified.  No suspicious adnexal mass on provided images.      Electronically signed by: Patricia Moran  Date:    2024  Time:    16:44               Narrative:    EXAMINATION:  US PELVIS COMP WITH TRANSVAG NON-OB (XPD)    CLINICAL HISTORY:  vaginal bleeding and pelvic pain;    TECHNIQUE:  Transabdominal sonography of the pelvis was performed, followed by transvaginal sonography to better evaluate the uterus and ovaries.    COMPARISON:  None.    FINDINGS:  Uterus:    Size: 12 x 8.2 x 8.5 cm    The parenchyma is diffusely heterogeneous, blurring the endometrial margin.  Endometrium is normal caliber measuring 1.7 cm.    Right ovary:    Not visualized    Left ovary:    Not visualized    Free Fluid:    None.                                     Assessment/Plan:   43 y.o.  admitted for acute AUB   Active Diagnoses:    Diagnosis Date Noted POA    PRINCIPAL PROBLEM:  Abnormal uterine bleeding (AUB) [N93.9] 2024 Unknown      Problems Resolved During this Admission:       Abnormal Uterine Bleeding  - Patient afebrile, VSS.   - CBC: 9.7/30 > 7.8/24 > Plan to transfuse 1uPRBC   - UPT negative  - TVUS: Enlarged, heterogeneous uterus suspicious for adenomyosis.  -  PE: Moderate amount of bleeding and small clots in posterior vaginal vault. Blood able to be cleared with 5-6 procto swab. Slow oozing of blood from external cervical os.   - No briks/active bleeding noted however patient continuing to saturate 1 pad every 1-2 hours.  - Given clinical picture and exam findings, recommend inpatient management for acute vaginal bleeding. No surgical management indicated at this time.    - Plan to initiate therapy with IV estrogen 25 mg q6h x 4 doses.   - Strict pad counts   - AM CBC     Hypertension   - Reports being on losartan at home  - However, patient hypotensive on this admission.    DM2  - Home metformin 500 mg BID held  -  POCT 167 on admit    Compa Kovacs MD - PGY2  Obstetrics & Gynecology  [facility] - Emergency Dept

## 2024-05-31 VITALS
DIASTOLIC BLOOD PRESSURE: 66 MMHG | HEIGHT: 55 IN | BODY MASS INDEX: 42.91 KG/M2 | HEART RATE: 64 BPM | RESPIRATION RATE: 18 BRPM | TEMPERATURE: 98 F | OXYGEN SATURATION: 100 % | SYSTOLIC BLOOD PRESSURE: 106 MMHG | WEIGHT: 185.44 LBS

## 2024-05-31 LAB
BASOPHILS # BLD AUTO: 0.06 K/UL (ref 0–0.2)
BASOPHILS NFR BLD: 0.3 % (ref 0–1.9)
DIFFERENTIAL METHOD BLD: ABNORMAL
EOSINOPHIL # BLD AUTO: 0.1 K/UL (ref 0–0.5)
EOSINOPHIL NFR BLD: 0.7 % (ref 0–8)
ERYTHROCYTE [DISTWIDTH] IN BLOOD BY AUTOMATED COUNT: 15.7 % (ref 11.5–14.5)
HCT VFR BLD AUTO: 29.9 % (ref 37–48.5)
HGB BLD-MCNC: 9.9 G/DL (ref 12–16)
IMM GRANULOCYTES # BLD AUTO: 0.09 K/UL (ref 0–0.04)
IMM GRANULOCYTES NFR BLD AUTO: 0.5 % (ref 0–0.5)
LYMPHOCYTES # BLD AUTO: 2.6 K/UL (ref 1–4.8)
LYMPHOCYTES NFR BLD: 13.6 % (ref 18–48)
MCH RBC QN AUTO: 23.9 PG (ref 27–31)
MCHC RBC AUTO-ENTMCNC: 33.1 G/DL (ref 32–36)
MCV RBC AUTO: 72 FL (ref 82–98)
MONOCYTES # BLD AUTO: 1 K/UL (ref 0.3–1)
MONOCYTES NFR BLD: 5.2 % (ref 4–15)
NEUTROPHILS # BLD AUTO: 14.9 K/UL (ref 1.8–7.7)
NEUTROPHILS NFR BLD: 79.7 % (ref 38–73)
NRBC BLD-RTO: 0 /100 WBC
PLATELET # BLD AUTO: 435 K/UL (ref 150–450)
PMV BLD AUTO: 8.6 FL (ref 9.2–12.9)
POCT GLUCOSE: 128 MG/DL (ref 70–110)
POCT GLUCOSE: 134 MG/DL (ref 70–110)
POCT GLUCOSE: 148 MG/DL (ref 70–110)
RBC # BLD AUTO: 4.14 M/UL (ref 4–5.4)
WBC # BLD AUTO: 18.76 K/UL (ref 3.9–12.7)

## 2024-05-31 PROCEDURE — 36415 COLL VENOUS BLD VENIPUNCTURE: CPT

## 2024-05-31 PROCEDURE — 63600175 PHARM REV CODE 636 W HCPCS

## 2024-05-31 PROCEDURE — 99233 SBSQ HOSP IP/OBS HIGH 50: CPT | Mod: ,,, | Performed by: OBSTETRICS & GYNECOLOGY

## 2024-05-31 PROCEDURE — 63600175 PHARM REV CODE 636 W HCPCS: Mod: JZ,JG | Performed by: GENERAL PRACTICE

## 2024-05-31 PROCEDURE — 25000003 PHARM REV CODE 250

## 2024-05-31 PROCEDURE — 85025 COMPLETE CBC W/AUTO DIFF WBC: CPT

## 2024-05-31 RX ORDER — IBUPROFEN 600 MG/1
600 TABLET ORAL EVERY 6 HOURS
Status: DISCONTINUED | OUTPATIENT
Start: 2024-05-31 | End: 2024-05-31 | Stop reason: HOSPADM

## 2024-05-31 RX ORDER — HYDROCODONE BITARTRATE AND ACETAMINOPHEN 5; 325 MG/1; MG/1
1 TABLET ORAL EVERY 6 HOURS PRN
Qty: 10 TABLET | Refills: 0 | Status: SHIPPED | OUTPATIENT
Start: 2024-05-31

## 2024-05-31 RX ORDER — ONDANSETRON 4 MG/1
8 TABLET, ORALLY DISINTEGRATING ORAL EVERY 6 HOURS PRN
Qty: 20 TABLET | Refills: 0 | Status: SHIPPED | OUTPATIENT
Start: 2024-05-31

## 2024-05-31 RX ORDER — MEDROXYPROGESTERONE ACETATE 10 MG/1
10 TABLET ORAL DAILY
Qty: 30 TABLET | Refills: 11 | Status: SHIPPED | OUTPATIENT
Start: 2024-05-31 | End: 2025-05-31

## 2024-05-31 RX ORDER — IBUPROFEN 600 MG/1
600 TABLET ORAL EVERY 6 HOURS PRN
Qty: 30 TABLET | Refills: 2 | Status: SHIPPED | OUTPATIENT
Start: 2024-05-31

## 2024-05-31 RX ADMIN — IBUPROFEN 600 MG: 600 TABLET, FILM COATED ORAL at 12:05

## 2024-05-31 RX ADMIN — CONJUGATED ESTROGENS 25 MG: 25 INJECTION, POWDER, LYOPHILIZED, FOR SOLUTION INTRAMUSCULAR; INTRAVENOUS at 12:05

## 2024-05-31 RX ADMIN — CONJUGATED ESTROGENS 25 MG: 25 INJECTION, POWDER, LYOPHILIZED, FOR SOLUTION INTRAMUSCULAR; INTRAVENOUS at 05:05

## 2024-05-31 RX ADMIN — KETOROLAC TROMETHAMINE 30 MG: 30 INJECTION, SOLUTION INTRAMUSCULAR; INTRAVENOUS at 12:05

## 2024-05-31 RX ADMIN — KETOROLAC TROMETHAMINE 30 MG: 30 INJECTION, SOLUTION INTRAMUSCULAR; INTRAVENOUS at 05:05

## 2024-05-31 NOTE — PROGRESS NOTES
Progress Note  Gynecology    Admit Date: 2024  LOS: 2    Reason for Admission:  Abnormal uterine bleeding (AUB)    SUBJECTIVE:     Rama Rashid is a 43 y.o.  admitted for IV estrogen in the setting of AUB.     Overnight, patient only required 1 pad change, reports significant decreased in amount of bleeding. Denies significant symptoms of anemia. Ambulating to restroom. Reports improved nausea and vomiting but has not eaten significant amount of food. Reports pain is much improved from yesterday. Voiding spontaneously. Passing flatus. History taken with assistance of language interpretor line.     OBJECTIVE:     Vital Signs   Temp:  [97.6 °F (36.4 °C)-98.9 °F (37.2 °C)] 98.8 °F (37.1 °C)  Pulse:  [67-93] 87  Resp:  [16-18] 16  SpO2:  [95 %-100 %] 100 %  BP: ()/(58-78) 108/67      Intake/Output Summary (Last 24 hours) at 2024 0647  Last data filed at 2024 1405  Gross per 24 hour   Intake 930 ml   Output --   Net 930 ml       Physical Exam:  Gen: A&Ox3, NAD  CV: Regular rate  Pulm: Normal respiratory effort  Abd: soft, obese, non-distended, mildly tender to palpation without rebound or guarding in the right lower quadrant   Ext: No peripheral edema, SCDs in place   : Deferred     Laboratory:  Lab Results   Component Value Date    WBC 18.76 (H) 2024    HGB 9.9 (L) 2024    HCT 29.9 (L) 2024    MCV 72 (L) 2024     2024         BMP  Lab Results   Component Value Date     (L) 2024    K 4.3 2024     2024    CO2 21 (L) 2024    BUN 8 2024    CREATININE 0.7 2024    CALCIUM 9.2 2024    ANIONGAP 13 2024    EGFRNORACEVR >60 2024     Lab Results   Component Value Date    ALT 15 2024    AST 25 2024    ALKPHOS 115 2024    BILITOT 0.4 2024         ASSESSMENT/PLAN:     Active Hospital Problems    Diagnosis  POA    *Abnormal uterine bleeding (AUB) [N93.9]  Unknown      Resolved  Hospital Problems   No resolved problems to display.       Assessment: 43 y.o.  admitted for IV estrogen in the setting of AUB. Hospital course complicated by abdominal pain and nausea/vomiting.     Plan:   Abnormal Uterine Bleeding  - Vital stable overnight   - 1 pad change overnight   - H/H trend: 7.8/24> 2u pRBC> 10.5/32.4 > 9.9/29.9  - Continue strict pad counts.   - Pain well controlled with scheduled toradol and oxy 5/10 PRN. Will transition to PO ibuprofen.   - IV zofran/compazine PRN for symptomatic nausea   - Will transition to PO provera  - If tolerating diet and pain well controlled on PO pain medication can consider PM discharge     T2DM  - Home metformin held inpatient   - POCT glucoses 3 times before meals and QHS.   - Diabetic diet   - SSI in place     Hypertension   - Holding home losartan given soft pressures     Dispo: As patient meets appropriate post-op milestones, plan for discharge as appropriate.     Amada Baxter MD  Obstetrics and Gynecology

## 2024-05-31 NOTE — DISCHARGE SUMMARY
Discharge Summary  Gynecology      Admit Date: 5/29/2024    Discharge Date and Time: 5/31/2024     Attending Physician: Madiha Mcqueen MD    Principal Diagnoses: Abnormal uterine bleeding (AUB)    Active Hospital Problems    Diagnosis  POA    *Abnormal uterine bleeding (AUB) [N93.9]  Unknown      Resolved Hospital Problems   No resolved problems to display.       Procedures: * No surgery found *    Discharged Condition: good    Hospital Course:   Rama Rashid is a 43 y.o.  female who presented on 5/29/2024 for acute AUB and pelvic pain. Patient was admitted for IV estrogen and pain control. Patient was anemic to 7.5/23.2 and received 2u pRBCs and domenico appropriately to 9.9/29.9. TVUS in ED signgifcant for adenomyosis.   On day of discharge, patient was urinating, ambulating, and tolerating a regular diet without difficulty. Vaginal bleeding stable and she was transitioned to PO provera. Pain was well controlled on PO medication. She was discharged home in stable condition with instructions to follow up with Gyn resident clinic in 2-3 weeks.     Consults: None    Significant Diagnostic Studies:  Recent Labs   Lab 05/30/24  0355 05/30/24  1134 05/31/24  0509   WBC 10.92 15.47* 18.76*   HGB 7.5* 10.5* 9.9*   HCT 23.2* 32.4* 29.9*   MCV 73* 74* 72*    405 435        Treatments:   1. IV estrogen  2. 2u pRBC    Disposition: Home or Self Care    Patient Instructions:   Current Discharge Medication List        START taking these medications    Details   HYDROcodone-acetaminophen (NORCO) 5-325 mg per tablet Take 1 tablet by mouth every 6 (six) hours as needed for Pain.  Qty: 10 tablet, Refills: 0    Comments: Quantity prescribed more than 7 day supply? No      ibuprofen (ADVIL,MOTRIN) 600 MG tablet Take 1 tablet (600 mg total) by mouth every 6 (six) hours as needed for Pain.  Qty: 30 tablet, Refills: 2      medroxyPROGESTERone (PROVERA) 10 MG tablet Take 1 tablet (10 mg total) by mouth once daily.  Qty: 30  tablet, Refills: 11      ondansetron (ZOFRAN-ODT) 4 MG TbDL Take 2 tablets (8 mg total) by mouth every 6 (six) hours as needed (Nausea).  Qty: 20 tablet, Refills: 0           CONTINUE these medications which have NOT CHANGED    Details   metFORMIN (GLUCOPHAGE) 500 MG tablet Take 500 mg by mouth daily with breakfast.             Discharge Procedure Orders   Diet Adult Regular     Notify your health care provider if you experience any of the following:  persistent nausea and vomiting or diarrhea     Notify your health care provider if you experience any of the following:  severe uncontrolled pain     Notify your health care provider if you experience any of the following:  temperature >100.4     Notify your health care provider if you experience any of the following:   Order Comments: If you are saturating a pad more than every 30 minutes.     Activity as tolerated        Follow-up Information       [facility]. Schedule an appointment as soon as possible for a visit .    Specialty: Obstetrics and Gynecology  Contact information:  [address], [facility] [address]  [facility] [address] [phone]             [facility] Emergency Dept. Go to .    Specialty: Emergency Medicine  Why: If symptoms worsen  Contact information:  [address]  [facility] [address] [phone]                           Amada Baxter MD PGY-2  Obstetrics and Gynecology  [facility]

## 2024-05-31 NOTE — NURSING
At this time pt discharges from medical/surgical unit from hospital; VS WNL; NADN; AVS discharge paperwork reviewed with pt and education provided via virtual RN and Amharic speaking ; peripheral IV access discontinued by nurse prior to pt discharge and pressure dressing applied to area; education provided  to leave pressure  dressing in place  for 2 hours before removal; further educate patient if bleeding occurs once removes pressure dressing to apply pressure for 15 mins X  2 as needed and seek emergency medical treatment should bleeding not cease in 30 mins; pt discharges from facility with significant other via wheelchair at this time accompanied by orderly to pharmacy to  medications per pt request.

## 2024-05-31 NOTE — PLAN OF CARE
POC reviewed with patient using  line. VSS on room air.  IV dressing is clean, dry, and intact. Physical assessment documented. Scheduled medications administered. Pt c/o vaginal bleeding, abdominal pain, and N/V. Pt had two episodes of vomiting.  No injuries, falls, or trauma occurred during this shift. Purposeful rounding completed. Bed alarm set. Bed is low and locked. Side rails up x2. Call light within reach. Safety maintained throughout shift. Care is ongoing. Family at bedside.     Problem: Adult Inpatient Plan of Care  Goal: Plan of Care Review  5/31/2024 0503 by Macrina Woodall RN  Outcome: Progressing  5/30/2024 2235 by Macrina Woodall RN  Outcome: Progressing     Problem: Adult Inpatient Plan of Care  Goal: Patient-Specific Goal (Individualized)  5/31/2024 0503 by Macrina Woodall RN  Outcome: Progressing  5/30/2024 2235 by Macrina Woodall RN  Outcome: Progressing     Problem: Nausea and Vomiting  Goal: Nausea and Vomiting Relief  5/31/2024 0503 by Macrina Woodall RN  Outcome: Progressing  5/30/2024 2235 by Macrina Woodall RN  Outcome: Progressing     Problem: Pain Acute  Goal: Optimal Pain Control and Function  5/31/2024 0503 by Macrina Woodall RN  Outcome: Progressing  5/30/2024 2235 by Macrina Woodall RN  Outcome: Progressing

## 2024-05-31 NOTE — PLAN OF CARE
[facility] - Med Surg ([facility])  Initial Discharge Assessment       Primary Care Provider: No, Primary Doctor    Admission Diagnosis: Dysfunctional uterine bleeding [N93.8]  Hypotension, unspecified hypotension type [I95.9]  Anemia, unspecified type [D64.9]    Admission Date: 5/29/2024  Expected Discharge Date:     Transition of Care Barriers: (P) None    Payor: /     Extended Emergency Contact Information  Primary Emergency Contact: Yris Root  Mobile Phone: [phone]  Relation: Daughter  Preferred language: Telugu   needed? Yes    Discharge Plan A: (P) Home with family  Discharge Plan B: (P) Home Health    No Pharmacies Listed    Initial Assessment (most recent)       Adult Discharge Assessment - 05/31/24 0848          Discharge Assessment    Assessment Type Discharge Planning Assessment (P)      Confirmed/corrected address, phone number and insurance Yes (P)      Confirmed Demographics Correct on Facesheet (P)      Source of Information patient;family;health record (P)      People in Home child(moreno), dependent;spouse (P)      Do you expect to return to your current living situation? Yes (P)      Do you have help at home or someone to help you manage your care at home? Yes (P)      Prior to hospitilization cognitive status: Alert/Oriented (P)      Current cognitive status: Alert/Oriented (P)      Walking or Climbing Stairs Difficulty no (P)      Dressing/Bathing Difficulty no (P)      Equipment Currently Used at Home none (P)      Readmission within 30 days? No (P)      Patient currently being followed by outpatient case management? No (P)      Do you currently have service(s) that help you manage your care at home? No (P)      Do you take prescription medications? Yes (P)      Do you have prescription coverage? Yes (P)      Do you have any problems affording any of your prescribed medications? TBD (P)      Is the patient taking medications as prescribed? yes (P)      How do you get to doctors  appointments? car, drives self;family or friend will provide (P)      Are you on dialysis? No (P)    Diabetic    Do you take coumadin? No (P)      Discharge Plan A Home with family (P)      Discharge Plan B Home Health (P)      Discharge Plan discussed with: Patient (P)      Transition of Care Barriers None (P)         Physical Activity    On average, how many days per week do you engage in moderate to strenuous exercise (like a brisk walk)? 0 days (P)      On average, how many minutes do you engage in exercise at this level? 0 min (P)         Financial Resource Strain    How hard is it for you to pay for the very basics like food, housing, medical care, and heating? Somewhat hard (P)         Housing Stability    In the last 12 months, was there a time when you were not able to pay the mortgage or rent on time? No (P)      At any time in the past 12 months, were you homeless or living in a shelter (including now)? No (P)         Transportation Needs    Has the lack of transportation kept you from medical appointments, meetings, work or from getting things needed for daily living? No (P)         Food Insecurity    Within the past 12 months, you worried that your food would run out before you got the money to buy more. Sometimes true (P)      Within the past 12 months, the food you bought just didn't last and you didn't have money to get more. Sometimes true (P)         Stress    Do you feel stress - tense, restless, nervous, or anxious, or unable to sleep at night because your mind is troubled all the time - these days? Only a little (P)         Social Isolation    How often do you feel lonely or isolated from those around you?  Never (P)         Alcohol Use    Q1: How often do you have a drink containing alcohol? Never (P)      Q2: How many drinks containing alcohol do you have on a typical day when you are drinking? Patient does not drink (P)      Q3: How often do you have six or more drinks on one occasion? Never  (P)         Utilities    In the past 12 months has the electric, gas, oil, or water company threatened to shut off services in your home? No (P)         Health Literacy    How often do you need to have someone help you when you read instructions, pamphlets, or other written material from your doctor or pharmacy? Never (P)                       Case Management Assessment     PCP: None   Pharmacy: Bedside     Patient Arrived From: Home   Existing Help at Home: Family     Barriers to Discharge: none     Discharge Plan:    A. Home with family    B. Home with family

## 2024-05-31 NOTE — PLAN OF CARE
Problem: Adult Inpatient Plan of Care  Goal: Plan of Care Review  Outcome: Progressing  Goal: Patient-Specific Goal (Individualized)  Outcome: Progressing  Goal: Absence of Hospital-Acquired Illness or Injury  Outcome: Progressing  Goal: Optimal Comfort and Wellbeing  Outcome: Progressing  Goal: Readiness for Transition of Care  Outcome: Progressing     Problem: Bariatric Environmental Safety  Goal: Safety Maintained with Care  Outcome: Progressing     Problem: Pain Acute  Goal: Optimal Pain Control and Function  Outcome: Progressing     Problem: Nausea and Vomiting  Goal: Nausea and Vomiting Relief  Outcome: Progressing

## 2024-05-31 NOTE — NURSING
AVS virtually reviewed with patient in its entirety via  Tomas #[id number], with emphasis on medications, follow-up appointments and reasons to return to the ED or contact the [facility]. Patient also encouraged to utilize their patient portal. Ease and convenience of use reiterated. Education complete and patient voiced understanding. All questions answered. Discharge teaching complete.

## 2024-06-03 ENCOUNTER — TELEPHONE (OUTPATIENT)
Dept: OBSTETRICS AND GYNECOLOGY | Facility: CLINIC | Age: 44
End: 2024-06-03

## 2024-06-03 NOTE — TELEPHONE ENCOUNTER
----- Message from Amada Baxter MD sent at 5/31/2024  4:41 PM CDT -----  HI V,     Can we get this patient scheduled for gyn resident clinic for hospital follow-up and hyst work-up in the next 2-3 weeks?    Thanks,   Aracelis

## 2024-06-20 ENCOUNTER — OFFICE VISIT (OUTPATIENT)
Dept: OBSTETRICS AND GYNECOLOGY | Facility: CLINIC | Age: 44
End: 2024-06-20

## 2024-06-20 VITALS
HEIGHT: 55 IN | BODY MASS INDEX: 41.83 KG/M2 | DIASTOLIC BLOOD PRESSURE: 68 MMHG | WEIGHT: 180.75 LBS | SYSTOLIC BLOOD PRESSURE: 110 MMHG

## 2024-06-20 DIAGNOSIS — N93.9 ABNORMAL UTERINE BLEEDING (AUB): Primary | ICD-10-CM

## 2024-06-20 PROCEDURE — 99999 PR PBB SHADOW E&M-EST. PATIENT-LVL III: CPT | Mod: PBBFAC,,,

## 2024-06-20 PROCEDURE — 99213 OFFICE O/P EST LOW 20 MIN: CPT | Mod: PBBFAC

## 2024-06-27 NOTE — PROGRESS NOTES
"History & Physical  [facility]      SUBJECTIVE:     Chief Complaint:      History of Present Illness:  Rama Rashid is a 44 y.o.  presents as hospital follow-up for AUB.     Patient was admitted for IV estrogen and 2u pRBCs. Workup at that time significant for TVUS that showed signs of adenomyosis. Since discharge, patient report minimal bleeding on provera daily. Reports significant relief from pelvic pain too. She desires hysterectomy for definitve management for her AUB.     Pmhx: T2Dm not on inuslin, HTN, Obesity  Pshx: tubal ligation via pfannenstiel incision, filomena, and appendectomy via open incisions   OB: , SVDs. Denies  sections.   Fhx: denies any family hx of breast, ovarian, endometrial, or colon cancer    Active medications, medical history, surgical history, family history, social history, and allergies all reviewed and updated in chart.  OB History          3    Para   3    Term                AB        Living   3         SAB        IAB        Ectopic        Multiple        Live Births                     Review of Systems   Constitutional:  Negative for chills, fatigue and fever.   Eyes:  Negative for visual disturbance.   Respiratory:  Negative for cough, shortness of breath and wheezing.    Cardiovascular:  Negative for chest pain.   Gastrointestinal:  Negative for abdominal pain, nausea and vomiting.   Genitourinary:  Positive for menorrhagia and menstrual problem. Negative for dysuria and vaginal bleeding.   Neurological:  Negative for syncope.   Hematological:  Negative for adenopathy.   Psychiatric/Behavioral:  Negative for depression.          OBJECTIVE:   /68   Ht 4' 7" (1.397 m)   Wt 82 kg (180 lb 12.4 oz)   LMP  (LMP Unknown)    Physical Exam  Constitutional:       General: She is awake. She is not in acute distress.     Appearance: Normal appearance. She is well-developed.   HENT:      Head: Normocephalic.   Neurological:      Mental " Status: She is alert and oriented to person, place, and time.   Skin:     General: Skin is warm.   Psychiatric:         Attention and Perception: Attention normal.         Mood and Affect: Mood normal.         Speech: Speech normal.         Behavior: Behavior is cooperative.        ASSESSMENT:   The encounter diagnosis was Abnormal uterine bleeding (AUB).      Plan:   Diagnoses and all orders for this visit:    Abnormal uterine bleeding (AUB)  - Patient with AUB history as above. Currently controlled on provera.   - Patient desires hysterectomy but is currently uninsured and would like to gain access to insurance before proceeding with surgery.   - Calls to medicaid office and financial assistance office made. FA office will reach out to patient to set up meeting. Counseled patient to answer phone when they call.   - She will need EMBx before surgery. Will schedule pending financial assistance.         Amada Baxter MD PGY-3  Obstetrics and Gynecology  [facility]

## 2024-06-28 PROBLEM — E66.9 OBESITY: Status: ACTIVE | Noted: 2024-05-21

## 2024-06-28 PROBLEM — E11.65 UNCONTROLLED TYPE 2 DIABETES MELLITUS WITH HYPERGLYCEMIA: Status: ACTIVE | Noted: 2024-06-28

## 2024-06-28 PROBLEM — F32.2 SEVERE DEPRESSION: Status: ACTIVE | Noted: 2024-06-28
